# Patient Record
Sex: MALE | Race: WHITE | NOT HISPANIC OR LATINO | Employment: FULL TIME | URBAN - METROPOLITAN AREA
[De-identification: names, ages, dates, MRNs, and addresses within clinical notes are randomized per-mention and may not be internally consistent; named-entity substitution may affect disease eponyms.]

---

## 2023-08-23 ENCOUNTER — APPOINTMENT (OUTPATIENT)
Dept: URGENT CARE | Facility: CLINIC | Age: 23
End: 2023-08-23

## 2023-08-23 PROCEDURE — 99283 EMERGENCY DEPT VISIT LOW MDM: CPT

## 2023-08-23 PROCEDURE — G0382 LEV 3 HOSP TYPE B ED VISIT: HCPCS

## 2024-07-16 ENCOUNTER — OFFICE VISIT (OUTPATIENT)
Dept: URGENT CARE | Facility: CLINIC | Age: 24
End: 2024-07-16
Payer: COMMERCIAL

## 2024-07-16 VITALS
WEIGHT: 145 LBS | TEMPERATURE: 98.3 F | RESPIRATION RATE: 18 BRPM | HEIGHT: 68 IN | SYSTOLIC BLOOD PRESSURE: 116 MMHG | OXYGEN SATURATION: 98 % | BODY MASS INDEX: 21.98 KG/M2 | HEART RATE: 58 BPM | DIASTOLIC BLOOD PRESSURE: 78 MMHG

## 2024-07-16 DIAGNOSIS — K04.7 DENTAL INFECTION: Primary | ICD-10-CM

## 2024-07-16 PROCEDURE — G0382 LEV 3 HOSP TYPE B ED VISIT: HCPCS

## 2024-07-16 PROCEDURE — S9083 URGENT CARE CENTER GLOBAL: HCPCS

## 2024-07-16 RX ORDER — CEFDINIR 300 MG/1
300 CAPSULE ORAL EVERY 12 HOURS SCHEDULED
Qty: 14 CAPSULE | Refills: 0 | Status: SHIPPED | OUTPATIENT
Start: 2024-07-16 | End: 2024-07-17 | Stop reason: ALTCHOICE

## 2024-07-16 RX ORDER — NAPROXEN 500 MG/1
500 TABLET ORAL 2 TIMES DAILY WITH MEALS
Qty: 14 TABLET | Refills: 0 | Status: SHIPPED | OUTPATIENT
Start: 2024-07-16 | End: 2024-07-17 | Stop reason: ALTCHOICE

## 2024-07-16 RX ORDER — CHLORHEXIDINE GLUCONATE ORAL RINSE 1.2 MG/ML
15 SOLUTION DENTAL 2 TIMES DAILY
Qty: 120 ML | Refills: 0 | Status: SHIPPED | OUTPATIENT
Start: 2024-07-16

## 2024-07-16 NOTE — LETTER
July 16, 2024     Patient: Uvaldo Fletcher   YOB: 2000   Date of Visit: 7/16/2024       To Whom it May Concern:    Uvaldo Fletcher was seen in my clinic on 7/16/2024. He may return to work on 07/17/2024 .  Please excuse absence on 7/14/2024 as it was related to this visit today.     If you have any questions or concerns, please don't hesitate to call.         Sincerely,          ELVIS Obrien        CC: No Recipients

## 2024-07-16 NOTE — LETTER
July 16, 2024     Patient: Uvaldo Fletcher   YOB: 2000   Date of Visit: 7/16/2024       To Whom it May Concern:    Uvaldo Fletcher was seen in my clinic on 7/16/2024. He may return to work on 07/17/2024 .  Please excuse 07/15/2024 absence as it was related to this visit as well.    If you have any questions or concerns, please don't hesitate to call.         Sincerely,          ELVIS Obrien        CC: No Recipients

## 2024-07-16 NOTE — PATIENT INSTRUCTIONS
"Take Omnicef and Peridex as prescribed.   Do not use Peridex for longer than 2 weeks. Use increases risk of tooth staining.  Take Naprosyn as prescribed  Do no take Naprosyn with other NSAIDs - ok to take tylenol     Follow up with dentist  Salt water gargles  Ice over area    Follow up with PCP in 3-5 days.  Proceed to  ER if symptoms worsen.    If tests are performed, our office will contact you with results only if changes need to made to the care plan discussed with you at the visit. You can review your full results on [facility].    Patient Education     Toothache   The Basics   Written by the doctors and editors at [facility]   What is a toothache? -- A toothache is pain in or around a tooth. It can also be called \"dental pain.\" It happens when the nerve in a tooth or the gum around a tooth is irritated.  A toothache might feel like intense, sharp pain, or a mild, dull ache. The pain might be constant, or come and go. It might also be worse when you chew, or make it hard to eat or sleep normally.  Sometimes, it is hard to tell which tooth is causing the pain, or the pain might be coming from more than 1 tooth. Tooth problems can also cause ear or jaw pain, or pain in other areas of the head and neck. Sometimes, pain from a sinus infection can also feel like a toothache.  What might cause a toothache? -- The most common causes of a toothache are tooth decay (cavities), gum disease, or a crack in a tooth.  Examples of other things that can cause tooth pain include (figure 1):   Problems like a loose filling, an impacted tooth, or an exposed root or nerve   Food trapped under the gums or between the teeth   Infection in a tooth or in the gums   An impacted tooth   Dental procedures like a pulled tooth or gum surgery   Injury to the mouth, face, neck, head, or back   Clenching or grinding the teeth   Problems with the jaw joint  Depending on what is causing your toothache, you might have other symptoms, too. " These might include:   Tooth sensitivity to hot or cold   Bleeding, red, or swollen gums   Swelling of any area of the head and neck   Trouble opening your mouth all of the way   Bad breath or a bad taste in your mouth  Will I need tests? -- Most problems with the teeth are treated by a dentist. The dentist will check your teeth, gums, and mouth. They gently touch and tap on the teeth to check them. The dentist might ask about problems with heat or cold, or if the pain has kept you from sleeping.  Sometimes, the dentist might do other tests, like an X-ray, to check your teeth.  How is a toothache treated? -- Treatment is based on what is causing the problem. Possible treatments include:   Rinsing a sore area with warm water to loosen any trapped food pieces   Putting a coating of fluoride on the tooth to treat early tooth decay   Removing decay, and placing a filling   Doing a root canal if the nerve of the tooth is infected or inflamed   Pulling a tooth if it cannot be fixed   Adjusting the bite of the teeth so they don't touch the opposing teeth too hard   Having you wear a  to protect your teeth  Is there anything I can do on my own to feel better? -- Ask the dentist what you should do after your appointment. Make sure that you understand exactly what you need to do to care for yourself. Ask questions if there is anything you do not understand.  The dentist might ask you to:   Take medicines to relieve pain, such as acetaminophen (sample brand name: Tylenol), ibuprofen (sample brand names: Advil, Motrin), or naproxen (sample brand name: Aleve).   Avoid very cold or very hot food or drinks if they bother your tooth.   Avoid chewing on the side of the mouth with pain for a few days.  Can I do anything to prevent a toothache?    Brush your teeth at least 2 times a day. Use toothpaste with fluoride.   Use dental floss to clean between your teeth every day.   See your dentist for regular cleaning and  checkups. The dentist might put fluoride or a sealant on your teeth. Even if the pain goes away, it is important to go to the dentist for a checkup.   Eat a healthy diet. Try to avoid or limit foods and drinks that are high in acid, sugar, and starch. These include things like chocolate, sweets, cakes, and fizzy or sugary drinks.   To help prevent tooth injury, wear a mouth guard or headgear when playing sports.   If you smoke, try to stop. Your doctor, nurse, or dentist can help you. Smoking can make some dental problems worse.  When should I call the doctor or dentist? -- Call for advice if:   You have signs of infection, such as:   A fever of 100.4°F (38°C) or higher   Swelling of the gums, neck, or face   Discharge or pus around a tooth   Your toothache doesn't go away in a few days, or the pain is getting worse or keeps you from sleeping.   You have trouble swallowing, breathing, chewing, or opening your mouth all of the way.   You have jaw pain along with ear, chest, shoulder, or arm pain.   You have a lot of bleeding from the gums.  All topics are updated as new evidence becomes available and our peer review process is complete.  This topic retrieved from [employer] on: Mar 13, 2024.  Topic 375402 Version 2.0  Release: 32.2.4 - C32.71  © 2024 UpToDate, Inc. and/or its affiliates. All rights reserved.  figure 1: Dental changes     This shows the inside of a tooth. The left side shows a healthy tooth. The enamel and gum cover the root. The right side shows some of the problems that can lead to tooth pain. These include wearing away of enamel, an exposed root, receding gums, tooth decay, and plaque buildup.  Graphic 830648 Version 1.0  Consumer Information Use and Disclaimer   Disclaimer: This generalized information is a limited summary of diagnosis, treatment, and/or medication information. It is not meant to be comprehensive and should be used as a tool to help the user understand and/or assess potential  diagnostic and treatment options. It does NOT include all information about conditions, treatments, medications, side effects, or risks that may apply to a specific patient. It is not intended to be medical advice or a substitute for the medical advice, diagnosis, or treatment of a health care provider based on the health care provider's examination and assessment of a patient's specific and unique circumstances. Patients must speak with a health care provider for complete information about their health, medical questions, and treatment options, including any risks or benefits regarding use of medications. This information does not endorse any treatments or medications as safe, effective, or approved for treating a specific patient. UpToDate, Inc. and its affiliates disclaim any warranty or liability relating to this information or the use thereof.The use of this information is governed by the Terms of Use, available at https://www.[employer].com/en/know/clinical-effectiveness-terms. 2024© UpToDate, Inc. and its affiliates and/or licensors. All rights reserved.  Copyright   © 2024 UpToDate, Inc. and/or its affiliates. All rights reserved.

## 2024-07-17 ENCOUNTER — OFFICE VISIT (OUTPATIENT)
Dept: URGENT CARE | Facility: CLINIC | Age: 24
End: 2024-07-17
Payer: COMMERCIAL

## 2024-07-17 VITALS
SYSTOLIC BLOOD PRESSURE: 136 MMHG | TEMPERATURE: 96.8 F | OXYGEN SATURATION: 95 % | RESPIRATION RATE: 18 BRPM | DIASTOLIC BLOOD PRESSURE: 86 MMHG | HEART RATE: 78 BPM

## 2024-07-17 DIAGNOSIS — K04.7 DENTAL INFECTION: Primary | ICD-10-CM

## 2024-07-17 PROCEDURE — G0382 LEV 3 HOSP TYPE B ED VISIT: HCPCS | Performed by: PHYSICIAN ASSISTANT

## 2024-07-17 PROCEDURE — S9083 URGENT CARE CENTER GLOBAL: HCPCS | Performed by: PHYSICIAN ASSISTANT

## 2024-07-17 RX ORDER — KETOROLAC TROMETHAMINE 10 MG/1
10 TABLET, FILM COATED ORAL EVERY 6 HOURS PRN
Qty: 15 TABLET | Refills: 0 | Status: SHIPPED | OUTPATIENT
Start: 2024-07-17

## 2024-07-17 RX ORDER — CLINDAMYCIN HYDROCHLORIDE 300 MG/1
300 CAPSULE ORAL 3 TIMES DAILY
Qty: 21 CAPSULE | Refills: 0 | Status: SHIPPED | OUTPATIENT
Start: 2024-07-17 | End: 2024-07-24

## 2024-07-17 NOTE — PROGRESS NOTES
[facility]        NAME: Uvaldo Fletcher is a 23 y.o. male  : 2000    MRN: [id number]  DATE: 2024  TIME: 10:30 AM    Assessment and Plan   Dental infection [K04.7]  1. Dental infection  clindamycin (CLEOCIN) 300 MG capsule    ketorolac (TORADOL) 10 mg tablet            Patient Instructions     Pt has dental infection and has started medication but no improvement. However, was just seen yesterday. I did switch him from Cefdinir to Clindamycin and instructed him to start taking a probiotic and also prescribed him oral Toradol to help with the pain and inflammation and to D/C the Naproxen. Discussed possible side effects of the medications and to not start taking until tonight as he took last dose of Naproxen and Cefdinir this morning. He voiced understanding.   Follow up with PCP in 3-5 days.  Proceed to  ER if symptoms worsen.    If tests have been performed at [facility], our office will contact you with results if changes need to be made to the care plan discussed with you at the visit.  You can review your full results on [facility].    Chief Complaint     Chief Complaint   Patient presents with    Dental Pain     Here yesterday for dental pain. Medication not working and needs a work note          History of Present Illness       Pt presents for reevaluation from previous visit yesterday. He was diagnosed with dental infection and prescribed Cefdinir and Naproxen. Has taken first few doses with no relief. He is requesting extension of his work note today.         Review of Systems   Review of Systems   Constitutional: Negative.    HENT:  Positive for dental problem. Negative for trouble swallowing.    Respiratory: Negative.     Cardiovascular: Negative.    Gastrointestinal: Negative.    Genitourinary: Negative.          Current Medications       Current Outpatient Medications:     chlorhexidine (PERIDEX) 0.12 % solution, Apply 15 mL to the mouth or throat 2 (two) times a day,  Disp: 120 mL, Rfl: 0    clindamycin (CLEOCIN) 300 MG capsule, Take 1 capsule (300 mg total) by mouth 3 (three) times a day for 7 days, Disp: 21 capsule, Rfl: 0    ketorolac (TORADOL) 10 mg tablet, Take 1 tablet (10 mg total) by mouth every 6 (six) hours as needed for moderate pain, Disp: 15 tablet, Rfl: 0    Current Allergies     Allergies as of 07/17/2024 - Reviewed 07/17/2024   Allergen Reaction Noted    Penicillins Rash 01/22/2014            The following portions of the patient's history were reviewed and updated as appropriate: allergies, current medications, past family history, past medical history, past social history, past surgical history and problem list.     Past Medical History:   Diagnosis Date    Asthma        History reviewed. No pertinent surgical history.    No family history on file.      Medications have been verified.        Objective   /86   Pulse 78   Temp (!) 96.8 °F (36 °C) (Temporal)   Resp 18   SpO2 95%   No LMP for male patient.       Physical Exam     Physical Exam  Vitals reviewed.   Constitutional:       General: He is not in acute distress.     Appearance: He is well-developed.   HENT:      Mouth/Throat:      Mouth: Mucous membranes are moist.      Pharynx: Oropharynx is clear.      Comments: Right posterior upper molar with localized gingival edema and TTP. No active drainage/bleeding. Airway is patent with no sign of obstruction.   Neurological:      Mental Status: He is alert and oriented to person, place, and time.

## 2024-07-17 NOTE — LETTER
July 17, 2024     Patient: Uvaldo Fletcher   YOB: 2000   Date of Visit: 7/17/2024       To Whom it May Concern:    Uvaldo Fletcher was seen in my clinic on 7/17/2024. He may return to work on 7/182024 .    If you have any questions or concerns, please don't hesitate to call.         Sincerely,          SEN WADE        CC: No Recipients

## 2025-01-14 ENCOUNTER — HOSPITAL ENCOUNTER (EMERGENCY)
Facility: HOSPITAL | Age: 25
Discharge: HOME | End: 2025-01-14
Attending: EMERGENCY MEDICINE

## 2025-01-14 VITALS
WEIGHT: 150 LBS | SYSTOLIC BLOOD PRESSURE: 151 MMHG | RESPIRATION RATE: 18 BRPM | DIASTOLIC BLOOD PRESSURE: 113 MMHG | HEART RATE: 56 BPM | OXYGEN SATURATION: 98 % | TEMPERATURE: 98.1 F | BODY MASS INDEX: 22.81 KG/M2

## 2025-01-14 DIAGNOSIS — K08.89 PAIN, DENTAL: Primary | ICD-10-CM

## 2025-01-14 PROCEDURE — 99282 EMERGENCY DEPT VISIT SF MDM: CPT

## 2025-01-14 PROCEDURE — 96372 THER/PROPH/DIAG INJ SC/IM: CPT

## 2025-01-14 PROCEDURE — 99284 EMERGENCY DEPT VISIT MOD MDM: CPT | Performed by: EMERGENCY MEDICINE

## 2025-01-14 RX ORDER — CLINDAMYCIN HYDROCHLORIDE 300 MG/1
300 CAPSULE ORAL 3 TIMES DAILY
Qty: 30 CAPSULE | Refills: 0 | Status: SHIPPED | OUTPATIENT
Start: 2025-01-14 | End: 2025-01-24

## 2025-01-14 RX ORDER — OXYCODONE AND ACETAMINOPHEN 5; 325 MG/1; MG/1
1 TABLET ORAL ONCE
Refills: 0 | Status: COMPLETED | OUTPATIENT
Start: 2025-01-14 | End: 2025-01-14

## 2025-01-14 RX ORDER — OXYCODONE AND ACETAMINOPHEN 5; 325 MG/1; MG/1
1 TABLET ORAL EVERY 4 HOURS PRN
Qty: 12 TABLET | Refills: 0 | Status: SHIPPED | OUTPATIENT
Start: 2025-01-14

## 2025-01-14 RX ORDER — KETOROLAC TROMETHAMINE 30 MG/ML
30 INJECTION, SOLUTION INTRAMUSCULAR; INTRAVENOUS ONCE
Status: COMPLETED | OUTPATIENT
Start: 2025-01-14 | End: 2025-01-14

## 2025-01-14 RX ORDER — CLINDAMYCIN HYDROCHLORIDE 150 MG/1
300 CAPSULE ORAL ONCE
Status: COMPLETED | OUTPATIENT
Start: 2025-01-14 | End: 2025-01-14

## 2025-01-14 RX ADMIN — CLINDAMYCIN HYDROCHLORIDE 300 MG: 150 CAPSULE ORAL at 22:08

## 2025-01-14 RX ADMIN — OXYCODONE HYDROCHLORIDE AND ACETAMINOPHEN 1 TABLET: 5; 325 TABLET ORAL at 22:07

## 2025-01-14 RX ADMIN — KETOROLAC TROMETHAMINE 30 MG: 30 INJECTION, SOLUTION INTRAMUSCULAR; INTRAVENOUS at 22:08

## 2025-01-15 NOTE — ED PROVIDER NOTES
Time reflects when diagnosis was documented in both MDM as applicable and the Disposition within this note       Time User Action Codes Description Comment    1/14/2025  9:55 PM Deborah Stan Add [K08.89] Pain, dental           ED Disposition       ED Disposition   Discharge    Condition   Stable    Date/Time   Tue Jan 14, 2025  9:55 PM    Comment   Uvaldo Fletcher discharge to home/self care.                   Assessment & Plan       Medical Decision Making  Patient is afebrile nontoxic well-appearing clinically and hemodynamically stable in the emergency department no evidence of Ludewig's angina or severe dental infection at this time clinically suspect dental infection and dental caries with dental pain.  will advise antibiotics and pain control and supportive care and prompt follow-up with dentist for further evaluation and definitive treatment return precautions and anticipatory guidance discussed.      Risk  Prescription drug management.             Medications   clindamycin (CLEOCIN) capsule 300 mg (has no administration in time range)   oxyCODONE-acetaminophen (PERCOCET) 5-325 mg per tablet 1 tablet (has no administration in time range)   ketorolac (TORADOL) injection 30 mg (has no administration in time range)       ED Risk Strat Scores                          SBIRT 20yo+      Flowsheet Row Most Recent Value   Initial Alcohol Screen: US AUDIT-C     1. How often do you have a drink containing alcohol? 0 Filed at: 01/14/2025 2154   2. How many drinks containing alcohol do you have on a typical day you are drinking?  0 Filed at: 01/14/2025 2154   3a. Male UNDER 65: How often do you have five or more drinks on one occasion? 0 Filed at: 01/14/2025 2154   3b. FEMALE Any Age, or MALE 65+: How often do you have 4 or more drinks on one occassion? 0 Filed at: 01/14/2025 2154   Audit-C Score 0 Filed at: 01/14/2025 2154                            History of Present Illness       Chief Complaint   Patient presents with     Dental Pain     Right sided dental pain, ongoing. Does not have dental insurance. Wants something for pain so he can go to bed.        Past Medical History:   Diagnosis Date    Asthma       History reviewed. No pertinent surgical history.   History reviewed. No pertinent family history.   Social History     Tobacco Use    Smoking status: Never    Smokeless tobacco: Never   Vaping Use    Vaping status: Every Day   Substance Use Topics    Alcohol use: Not Currently    Drug use: Yes     Types: Marijuana      E-Cigarette/Vaping    E-Cigarette Use Current Every Day User       E-Cigarette/Vaping Substances      I have reviewed and agree with the history as documented.     Patient is a 24-year-old male presents emergency department due to right upper dental pain has been ongoing for a while worse this evening no fevers or chills no difficulty swallowing or speaking or change in voice or swelling of the tongue or throat.        History provided by:  Patient  Dental Pain  Location:  Upper  Upper teeth location:  1/RU 3rd molar and 2/RU 2nd molar  Associated symptoms: no fever        Review of Systems   Constitutional:  Negative for fever.   HENT:  Positive for dental problem. Negative for trouble swallowing and voice change.    All other systems reviewed and are negative.          Objective       ED Triage Vitals [01/14/25 2152]   Temperature Pulse Blood Pressure Respirations SpO2 Patient Position - Orthostatic VS   98.1 °F (36.7 °C) 56 (!) 151/113 18 98 % Sitting      Temp Source Heart Rate Source BP Location FiO2 (%) Pain Score    Temporal -- Left arm -- 10 - Worst Possible Pain      Vitals      Date and Time Temp Pulse SpO2 Resp BP Pain Score FACES Pain Rating User   01/14/25 2152 98.1 °F (36.7 °C) 56 98 % 18 151/113 10 - Worst Possible Pain -- SV            Physical Exam  Vitals and nursing note reviewed.   Constitutional:       General: He is not in acute distress.     Appearance: Normal appearance.   HENT:       Head: Normocephalic and atraumatic.      Nose: Nose normal.      Mouth/Throat:      Dentition: Abnormal dentition. Dental tenderness, gingival swelling, dental caries and dental abscesses present.   Eyes:      Conjunctiva/sclera: Conjunctivae normal.   Pulmonary:      Effort: Pulmonary effort is normal. No respiratory distress.   Skin:     General: Skin is dry.   Neurological:      General: No focal deficit present.      Mental Status: He is alert and oriented to person, place, and time.         Results Reviewed       None            No orders to display       Procedures    ED Medication and Procedure Management   Prior to Admission Medications   Prescriptions Last Dose Informant Patient Reported? Taking?   chlorhexidine (PERIDEX) 0.12 % solution   No No   Sig: Apply 15 mL to the mouth or throat 2 (two) times a day   ketorolac (TORADOL) 10 mg tablet   No No   Sig: Take 1 tablet (10 mg total) by mouth every 6 (six) hours as needed for moderate pain      Facility-Administered Medications: None     Patient's Medications   Discharge Prescriptions    CLINDAMYCIN (CLEOCIN) 300 MG CAPSULE    Take 1 capsule (300 mg total) by mouth 3 (three) times a day for 10 days       Start Date: 1/14/2025 End Date: 1/24/2025       Order Dose: 300 mg       Quantity: 30 capsule    Refills: 0    OXYCODONE-ACETAMINOPHEN (PERCOCET) 5-325 MG PER TABLET    Take 1 tablet by mouth every 4 (four) hours as needed for moderate pain for up to 12 doses Max Daily Amount: 6 tablets       Start Date: 1/14/2025 End Date: --       Order Dose: 1 tablet       Quantity: 12 tablet    Refills: 0     No discharge procedures on file.  ED SEPSIS DOCUMENTATION   Time reflects when diagnosis was documented in both MDM as applicable and the Disposition within this note       Time User Action Codes Description Comment    1/14/2025  9:55 PM Stan Cabrera Add [K08.89] Pain, dental                  Stan Cabrera DO  01/14/25 2200

## 2025-06-24 ENCOUNTER — OFFICE VISIT (OUTPATIENT)
Dept: URGENT CARE | Facility: CLINIC | Age: 25
End: 2025-06-24

## 2025-06-24 VITALS
WEIGHT: 144 LBS | OXYGEN SATURATION: 97 % | BODY MASS INDEX: 22.6 KG/M2 | RESPIRATION RATE: 18 BRPM | DIASTOLIC BLOOD PRESSURE: 70 MMHG | HEART RATE: 80 BPM | SYSTOLIC BLOOD PRESSURE: 120 MMHG | HEIGHT: 67 IN | TEMPERATURE: 97.8 F

## 2025-06-24 DIAGNOSIS — K04.7 DENTAL ABSCESS: Primary | ICD-10-CM

## 2025-06-24 PROCEDURE — 99213 OFFICE O/P EST LOW 20 MIN: CPT

## 2025-06-24 RX ORDER — CLINDAMYCIN HYDROCHLORIDE 300 MG/1
300 CAPSULE ORAL 4 TIMES DAILY
Qty: 28 CAPSULE | Refills: 0 | Status: SHIPPED | OUTPATIENT
Start: 2025-06-24 | End: 2025-07-01

## 2025-06-24 RX ORDER — CHLORHEXIDINE GLUCONATE ORAL RINSE 1.2 MG/ML
15 SOLUTION DENTAL 2 TIMES DAILY
Qty: 120 ML | Refills: 0 | Status: SHIPPED | OUTPATIENT
Start: 2025-06-24

## 2025-06-24 NOTE — PROGRESS NOTES
[facility]        NAME: Uvaldo Fletcher is a 24 y.o. male  : 2000    MRN: [id number]  DATE: 2025  TIME: 11:22 AM    Assessment and Plan   Dental abscess [K04.7]  1. Dental abscess  chlorhexidine (PERIDEX) 0.12 % solution    Ambulatory Referral to Dentistry    clindamycin (CLEOCIN) 300 MG capsule        No acute red flag findings on physical examination or reported by patient.  Based on physical examination, plan to treat with oral antibiotic at this time for underlying dental abscess.  Peridex for antiseptic purposes.  Referral placed to dentistry at this time for follow-up. Tylenol/ibuprofen for pain/fever. Increased fluids & rest. May continue with other OTC and supportive therapies at home. Encouraged to follow up closely with family physician or healthcare provider. ED precautions provided/discussed. Patient in agreement with plan & verbalized understanding.      Patient Instructions   Take antibiotics as prescribed, being sure to complete full course of therapy even if you feel better!    Eat yogurt with live and active cultures and/or take a probiotic at least 3 hours before or after antibiotic dose. Monitor stool for diarrhea and/or blood. If this occurs, contact primary care doctor ASAP.      Do not use Peridex for longer than 2 weeks. Use increases risk of tooth staining.     Follow up with dentist. Referral placed today.    Salt water gargles.  Ice over area.  Ibuprofen 600-800mg + Tylenol 1000mg every 6 hours. Do not exceed this amount.    Follow up with PCP in 3-5 days.  Proceed to  ER if symptoms worsen.    If tests have been performed at [facility], our office will contact you with results if changes need to be made to the care plan discussed with you at the visit.  [facility]    Chief Complaint     Chief Complaint   Patient presents with    Swelling     Dental abscess to left side mouth          History of Present Illness        Dental Pain   This is a new problem. The current episode started yesterday. The problem occurs constantly. The problem has been gradually worsening. The pain is at a severity of 3/10. The pain is mild. Associated symptoms include facial pain. Pertinent negatives include no difficulty swallowing, fever, oral bleeding, sinus pressure or thermal sensitivity. He has tried acetaminophen, ice and NSAIDs for the symptoms. The treatment provided mild relief.       Review of Systems   Review of Systems   Constitutional:  Negative for activity change, appetite change, chills, diaphoresis, fatigue and fever.   HENT:  Positive for dental problem and facial swelling. Negative for congestion, ear discharge, ear pain, mouth sores, postnasal drip, rhinorrhea, sinus pressure, sinus pain, sore throat, trouble swallowing and voice change.    Respiratory:  Negative for cough, chest tightness, shortness of breath, wheezing and stridor.    Cardiovascular:  Negative for chest pain.   Gastrointestinal:  Negative for abdominal pain, nausea and vomiting.   Musculoskeletal:  Negative for arthralgias, back pain, gait problem, joint swelling, myalgias, neck pain and neck stiffness.   Skin:  Negative for color change, pallor, rash and wound.   Neurological:  Negative for dizziness, weakness, light-headedness and headaches.   Hematological:  Negative for adenopathy.         Current Medications     Current Medications[1]    Current Allergies     Allergies as of 06/24/2025 - Reviewed 06/24/2025   Allergen Reaction Noted    Amoxicillin Other (See Comments) 01/14/2025    Penicillins Rash 01/22/2014            The following portions of the patient's history were reviewed and updated as appropriate: allergies, current medications, past family history, past medical history, past social history, past surgical history and problem list.     Past Medical History[2]    Past Surgical History[3]    Family History[4]      Medications have been  "verified.        Objective   /70   Pulse 80   Temp 97.8 °F (36.6 °C) (Tympanic)   Resp 18   Ht 5' 7\" (1.702 m)   Wt 65.3 kg (144 lb)   SpO2 97%   BMI 22.55 kg/m²   No LMP for male patient.       Physical Exam     Physical Exam  Vitals and nursing note reviewed.   Constitutional:       General: He is not in acute distress.     Appearance: Normal appearance. He is ill-appearing. He is not toxic-appearing or diaphoretic.   HENT:      Head: Normocephalic and atraumatic.      Jaw: There is normal jaw occlusion. Tenderness, swelling and pain on movement present. No trismus or malocclusion.        Comments: Swelling & TTP as indicated on diagram above.      Right Ear: Tympanic membrane, ear canal and external ear normal.      Left Ear: Tympanic membrane, ear canal and external ear normal.      Nose: Nose normal.      Mouth/Throat:      Lips: Pink. No lesions.      Mouth: Mucous membranes are moist. No injury, oral lesions or angioedema.      Dentition: Abnormal dentition. Does not have dentures. Dental tenderness, gingival swelling, dental caries and dental abscesses present. No gum lesions.      Tongue: No lesions. Tongue does not deviate from midline.      Palate: No mass and lesions.      Pharynx: Oropharynx is clear. No pharyngeal swelling, oropharyngeal exudate, posterior oropharyngeal erythema or uvula swelling.        Comments: Broken tooth with surrounding gingival erythema and swelling as indicated on diagram above.  No current bleeding/drainage noted on exam.    Eyes:      Extraocular Movements: Extraocular movements intact.      Conjunctiva/sclera: Conjunctivae normal.      Pupils: Pupils are equal, round, and reactive to light.       Cardiovascular:      Rate and Rhythm: Normal rate and regular rhythm.      Pulses: Normal pulses.      Heart sounds: Normal heart sounds.   Pulmonary:      Effort: Pulmonary effort is normal. No respiratory distress.      Breath sounds: Normal breath sounds. No " stridor. No wheezing, rhonchi or rales.   Chest:      Chest wall: No tenderness.     Musculoskeletal:         General: Normal range of motion.      Cervical back: Normal range of motion and neck supple. No tenderness.   Lymphadenopathy:      Cervical: No cervical adenopathy.     Skin:     General: Skin is warm.      Capillary Refill: Capillary refill takes less than 2 seconds.      Coloration: Skin is not jaundiced or pale.      Findings: No bruising, erythema, lesion or rash.     Neurological:      General: No focal deficit present.      Mental Status: He is alert.      Sensory: No sensory deficit.      Motor: No weakness.      Gait: Gait normal.     Psychiatric:         Mood and Affect: Mood normal.         Behavior: Behavior normal.         Thought Content: Thought content normal.         Judgment: Judgment normal.                        [1]   Current Outpatient Medications:     chlorhexidine (PERIDEX) 0.12 % solution, Apply 15 mL to the mouth or throat 2 (two) times a day Swish & spit., Disp: 120 mL, Rfl: 0    clindamycin (CLEOCIN) 300 MG capsule, Take 1 capsule (300 mg total) by mouth 4 (four) times a day for 7 days, Disp: 28 capsule, Rfl: 0    chlorhexidine (PERIDEX) 0.12 % solution, Apply 15 mL to the mouth or throat 2 (two) times a day (Patient not taking: Reported on 6/24/2025), Disp: 120 mL, Rfl: 0    ketorolac (TORADOL) 10 mg tablet, Take 1 tablet (10 mg total) by mouth every 6 (six) hours as needed for moderate pain (Patient not taking: Reported on 6/24/2025), Disp: 15 tablet, Rfl: 0    oxyCODONE-acetaminophen (PERCOCET) 5-325 mg per tablet, Take 1 tablet by mouth every 4 (four) hours as needed for moderate pain for up to 12 doses Max Daily Amount: 6 tablets (Patient not taking: Reported on 6/24/2025), Disp: 12 tablet, Rfl: 0  [2]   Past Medical History:  Diagnosis Date    Asthma    [3] No past surgical history on file.  [4] No family history on file.

## 2025-06-24 NOTE — PATIENT INSTRUCTIONS
Take antibiotics as prescribed, being sure to complete full course of therapy even if you feel better!    Eat yogurt with live and active cultures and/or take a probiotic at least 3 hours before or after antibiotic dose. Monitor stool for diarrhea and/or blood. If this occurs, contact primary care doctor ASAP.      Do not use Peridex for longer than 2 weeks. Use increases risk of tooth staining.     Follow up with dentist. Referral placed today.    Salt water gargles.  Ice over area.  Ibuprofen 600-800mg + Tylenol 1000mg every 6 hours. Do not exceed this amount.    Follow up with PCP in 3-5 days.  Proceed to  ER if symptoms worsen.    If tests have been performed at [facility], our office will contact you with results if changes need to be made to the care plan discussed with you at the visit.  You can review your full results on [facility].